# Patient Record
(demographics unavailable — no encounter records)

---

## 2024-10-11 NOTE — END OF VISIT
[FreeTextEntry3] : I personally saw and examined Ms. JOSEMANUEL JOHNSON in detail this visit today. I personally reviewed the HPI, PMH, FH, SH, ROS and medications/allergies. I have spoken to LUCI Weiss regarding the history and have personally determined the assessment and plan of care, and documented this myself. I was present and participated in all key portions of the encounter and all procedures noted above. I have made changes in the body of the note where appropriate.   Attesting Faculty: See Attending Signature Below

## 2024-10-11 NOTE — PROCEDURE
[FreeTextEntry3] : bilateral cerumen removed with curette. After removal of cerumen canal noted to be normal without edema, purulence or inflammation. Patient tolerated procedure well.

## 2024-10-11 NOTE — HISTORY OF PRESENT ILLNESS
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 She was referred by her PCP to have her ears cleaned. She has no ear pan, drainage, tinnitus or change in hearing. She has no nose or throat complaints. She is prone to ear infections. Notes she had an audio in Jan of this year.

## 2024-10-11 NOTE — ASSESSMENT
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  Excessive wax bilateral -Wax removed from bilateral ear canal  -F/u annually for ear cleaning -Will have her fax over her Audio from Jan to review -f/u yearly for [facility]

## 2024-10-11 NOTE — CONSULT LETTER
[Dear  ___] : Dear  [unfilled], [Consult Letter:] : I had the pleasure of evaluating your patient, [unfilled]. [Please see my note below.] : Please see my note below. [Consult Closing:] : Thank you very much for allowing me to participate in the care of this patient.  If you have any questions, please do not hesitate to contact me. [Sincerely,] : Sincerely, [FreeTextEntry3] : Faye Butler MD Otolaryngology and Cranial Base Surgery Attending Physician - Department of Otolaryngology and Head & Neck Surgery [facility]  [employer] [facility]